# Patient Record
Sex: FEMALE
[De-identification: names, ages, dates, MRNs, and addresses within clinical notes are randomized per-mention and may not be internally consistent; named-entity substitution may affect disease eponyms.]

---

## 2021-06-08 ENCOUNTER — APPOINTMENT (OUTPATIENT)
Dept: SURGERY | Facility: CLINIC | Age: 41
End: 2021-06-08
Payer: OTHER GOVERNMENT

## 2021-06-08 VITALS — HEIGHT: 65.5 IN | BODY MASS INDEX: 42.63 KG/M2 | WEIGHT: 259 LBS

## 2021-06-08 DIAGNOSIS — E28.2 POLYCYSTIC OVARIAN SYNDROME: ICD-10-CM

## 2021-06-08 DIAGNOSIS — Z00.00 ENCOUNTER FOR GENERAL ADULT MEDICAL EXAMINATION W/OUT ABNORMAL FINDINGS: ICD-10-CM

## 2021-06-08 PROCEDURE — 99204 OFFICE O/P NEW MOD 45 MIN: CPT | Mod: 95

## 2021-06-08 NOTE — ADDENDUM
[FreeTextEntry1] : This note was written by Elda Koehler on 06/08/2021 acting as scribe for BRYON Brunner.

## 2021-06-08 NOTE — END OF VISIT
[FreeTextEntry3] : All medical record entries made by the [facility] were at my, BRYON Brunner, discretion and personally dictated by me on 06/08/2021 . I have reviewed the chart and agree that the record accurately reflects my personal performance of the history, physical exam, assessment and plan. I have also personally directed, reviewed and agreed to the chart.

## 2021-06-08 NOTE — PLAN
Patent
[FreeTextEntry1] : Will begin bariatric work up including sleep study to rule out any sleep disorders associated with morbid obesity.

## 2021-06-08 NOTE — ASSESSMENT
[FreeTextEntry1] : Pt is a 39 y/o F with PMHx of obesity (BMI 42.4), PCOS who presents today via telehealth feeling well for initial bariatric consult. We discussed various surgeries and I informed pt she may be a good candidate for a sleeve gastrectomy, but we can decide after all her testing and workup. Will begin bariatric work up including sleep study to rule out any sleep disorders associated with morbid obesity.

## 2021-06-08 NOTE — HISTORY OF PRESENT ILLNESS
[Home] : at home, [unfilled] , at the time of the visit. [Medical Office: ([facility])___] : at the medical office located in  [Verbal consent obtained from patient] : the patient, [unfilled] [de-identified] : Pt is a 39 y/o F with PMHx of obesity (BMI 42.4), PCOS and PSHx of 2 C-sections who presents today via telehealth feeling well for initial bariatric consult. Pt reports struggling with her weight for several years. She has tried several diet and exercise regimens with no lasting weight loss success. She denies problems with BM and urination. Denies heartburn, reflux. She is currently working in [employer]. She is  with two children and states her family is supportive of her decision to seek bariatric surgery. She denies tobacco use and endorses social alcohol use.\par Patient to go for sleep study to evaluate any sleep disordered breathing associated with morbid obesity.

## 2021-06-10 ENCOUNTER — APPOINTMENT (OUTPATIENT)
Dept: BARIATRICS | Facility: CLINIC | Age: 41
End: 2021-06-10